# Patient Record
Sex: FEMALE | Race: WHITE | Employment: UNEMPLOYED
[De-identification: names, ages, dates, MRNs, and addresses within clinical notes are randomized per-mention and may not be internally consistent; named-entity substitution may affect disease eponyms.]

---

## 2024-08-28 ENCOUNTER — HOSPITAL ENCOUNTER (OUTPATIENT)
Facility: HOSPITAL | Age: 39
Setting detail: RECURRING SERIES
Discharge: HOME | End: 2024-08-31
Payer: MEDICAID

## 2024-08-28 PROCEDURE — 97162 PT EVAL MOD COMPLEX 30 MIN: CPT

## 2024-08-28 NOTE — THERAPY EVALUATION
[facility]  [address], [address], [place] [address] Ph:[phone] Fx: [phone]  Plan of Care / Statement of Necessity for Physical Therapy Services     Patient Name: Nereyda Villavicencio : 1985   Medical   Diagnosis:  Bilateral knee pain [M25.561, M25.562] Treatment Diagnosis: M25.561  RIGHT KNEE PAIN and M25.562  LEFT KNEE PAIN    Onset Date: 2024     Referral Source: MARCOS Martinez III, MD Start of Care (SOC): 2024   Prior Hospitalization: See medical history Provider #: [id number]   Prior Level of Function: Mostly IND all ADLs   Comorbidities: Other: anxiety, HTN       Assessment / gold information:  Nereyda Villavicencio is a 39 y.o. female who presents to skilled PT for the treatment diagnosis of B knee pain. Gradually worsening since 2024.  Pt reports that she has OA and bone spurs in both knees. She does not have constant pain all the time but right now it hurts. Pt is limited in terms of walking because the knees get tired, start to shake, and can sometimes lead to falls. Limited to walking in the grocery store due to knee pain and fatigue. Reports that the knees can buckle on her at random. Also notes pain when going from sitting to standing and can cause her knees to give out on her. Notes pain with stairs as well. She does swim a lot and that takes a lot of pressure off the knees. Had injection for the R knee in April, May or  but it did not really help. No Injections for the L knee. The L knee hurts more than the R knee.     Also notes of lower back pain with varied diagnoses from various providers. This has been an issue since middle school. Has done PT and had injections for this. The steroid shot did seem to help. It has been a long time since doing the PT. Most recent MRI was about 5 years ago. Pain is located more in the L side of the lower back. Denies nerve pains and sxs in the legs. Staying in one position  seems to bother the back more. Movement seems to help some.     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 Mom is high fall risk.     Pain:   Current: 5/10     Worst: 8/10    Best: 0/10      Objective:    Pain location: R distal quad, retropatellar, and posterior knee and L retropatellar more medial     Functional Activity:  Quad set - strong with some pain on R   LAQ - R knee full ROM without pain, L knee partial ROM with pain   SLR - IND   Sit to stand - incr pain   Bed mobility - IND    Lumbar ROM   Flexion 75% stretching  Extension 75%   L SB 75%   R SB 75% compression R   L rot 85%  R rot 85%    ROM/Strength        AROM                     PROM        Strength (1-5)  Hip Left Right Left Right Left Right   Flexion - - - - - -   Extension - - - - - -   Abduction - - - - 4+/5 4+/5   Adduction - - - - - -   ER WFL WFL 52 p!  56 4+/5 4+/5   IR WFL WFL 12 12 4+/5 4+/5    Knee Left Right Left Right Left Right   Extension 0 0 - - 4+/5 4+/5 p!   Flexion 130 p! 130 - - 4+/5  4+/5     Special Test:  HS 90/90: B LE 72 deg   Devan's - R moderate, L mild   Ely's R 126 deg L 134 deg     Palpation:   No sig TTP around knees    Pt presents with s/sxs consistent more with PFPS of B knees. Pt would benefit from skilled PT services addressing the current ROM, strength, functional performance, and balance impairments so that the patient to return to performing all ADLs with minimal restriction/limitation or without any functional limitations.     HEP provided to the patient in order to promote improvement and self management of symptoms and functional performance     Not post operative    Evaluation Complexity:  History:  MEDIUM  Complexity : 1-2 comorbidities / personal factors will impact the outcome/ POC ; Examination:  MEDIUM Complexity : 3 Standardized tests and measures addressin body structure, function, activity limitation and / or participation in recreation  ;Presentation:  HIGH Complexity : Unstable and unpredictable

## 2024-08-28 NOTE — PROGRESS NOTES
PHYSICAL / OCCUPATIONAL THERAPY - DAILY TREATMENT NOTE (updated )  For Eval visit    Patient Name: Nereyda Villavicencio    Date: 2024    : 1985  Insurance: Payor: [facility] MEDICAID / Plan: VAHE [facility] [employer]EEPERS PLUS / Product Type: *No Product type* /      Patient  verified yes     Visit #   Current / Total 1 16   Time   In / Out 12:30 1:25   Pain   In / Out 5/10 5/10   Subjective Functional Status/Changes: See POC     TREATMENT AREA =  Bilateral knee pain [M25.561, M25.562]    OBJECTIVE           50 min   Eval - untimed                      Therapeutic Procedures:  Tx Min Billable or 1:1 Min (if diff from Tx Min) Procedure, Rationale, Specifics   5  [id number] Self Care/Home Management (timed):  improve patient knowledge and understanding of pain reducing techniques, positioning, posture/ergonomics, home safety, activity modification, diagnosis/prognosis, and physical therapy expectations, procedures and progression  to improve patient's ability to progress to PLOF and address remaining functional goals.  (see flow sheet as applicable)     Details if applicable:    Reviewed diagnosis, prognosis, therapy progression   Reviewed and educated on HEP           Details if applicable:      []   assessing strength/ROM  []   assessing activity performance (ex: sit to stand, stair negotiation)  []   assessing balance/control (ex. Baker, DGI, SLS)          Details if applicable:      []   assessing strength/ROM  []   assessing activity performance (ex: sit to stand, stair negotiation)  []   assessing balance/control (ex. Baker, DGI, SLS)          Details if applicable:      []   assessing strength/ROM  []   assessing activity performance (ex: sit to stand, stair negotiation)  []   assessing balance/control (ex. Baker, DGI, SLS)          Details if applicable:       5  [facility] Totals Reminder: bill using total billable min of TIMED therapeutic procedures (example: do not include dry needle or estim unattended,

## 2024-09-12 ENCOUNTER — TELEPHONE (OUTPATIENT)
Facility: HOSPITAL | Age: 39
End: 2024-09-12

## 2025-06-09 ENCOUNTER — NEW PATIENT (OUTPATIENT)
Age: 40
End: 2025-06-09

## 2025-06-09 DIAGNOSIS — H16.143: ICD-10-CM

## 2025-06-09 DIAGNOSIS — E11.9: ICD-10-CM

## 2025-06-09 DIAGNOSIS — H04.123: ICD-10-CM

## 2025-06-09 PROCEDURE — 92004 COMPRE OPH EXAM NEW PT 1/>: CPT
